# Patient Record
Sex: FEMALE | Race: WHITE | HISPANIC OR LATINO
[De-identification: names, ages, dates, MRNs, and addresses within clinical notes are randomized per-mention and may not be internally consistent; named-entity substitution may affect disease eponyms.]

---

## 2018-05-18 ENCOUNTER — RESULT REVIEW (OUTPATIENT)
Age: 23
End: 2018-05-18

## 2019-02-01 ENCOUNTER — TRANSCRIPTION ENCOUNTER (OUTPATIENT)
Age: 24
End: 2019-02-01

## 2019-09-10 ENCOUNTER — APPOINTMENT (OUTPATIENT)
Dept: DERMATOLOGY | Facility: CLINIC | Age: 24
End: 2019-09-10
Payer: MEDICAID

## 2019-09-10 ENCOUNTER — LABORATORY RESULT (OUTPATIENT)
Age: 24
End: 2019-09-10

## 2019-09-10 DIAGNOSIS — D48.5 NEOPLASM OF UNCERTAIN BEHAVIOR OF SKIN: ICD-10-CM

## 2019-09-10 PROCEDURE — 99204 OFFICE O/P NEW MOD 45 MIN: CPT | Mod: 25

## 2019-09-10 PROCEDURE — 11104 PUNCH BX SKIN SINGLE LESION: CPT

## 2019-09-22 LAB — CORE LAB BIOPSY: NORMAL

## 2019-09-24 ENCOUNTER — APPOINTMENT (OUTPATIENT)
Dept: DERMATOLOGY | Facility: CLINIC | Age: 24
End: 2019-09-24
Payer: MEDICAID

## 2019-09-24 ENCOUNTER — LABORATORY RESULT (OUTPATIENT)
Age: 24
End: 2019-09-24

## 2019-09-24 DIAGNOSIS — L60.3 NAIL DYSTROPHY: ICD-10-CM

## 2019-09-24 DIAGNOSIS — L73.9 FOLLICULAR DISORDER, UNSPECIFIED: ICD-10-CM

## 2019-09-24 PROCEDURE — 99213 OFFICE O/P EST LOW 20 MIN: CPT

## 2019-10-08 LAB — BACTERIA SPEC CULT: ABNORMAL

## 2019-12-05 ENCOUNTER — RESULT REVIEW (OUTPATIENT)
Age: 24
End: 2019-12-05

## 2020-03-03 ENCOUNTER — APPOINTMENT (OUTPATIENT)
Dept: DERMATOLOGY | Facility: CLINIC | Age: 25
End: 2020-03-03

## 2020-03-14 ENCOUNTER — APPOINTMENT (OUTPATIENT)
Dept: DERMATOLOGY | Facility: CLINIC | Age: 25
End: 2020-03-14
Payer: COMMERCIAL

## 2020-03-14 VITALS — WEIGHT: 140 LBS | BODY MASS INDEX: 24.8 KG/M2 | HEIGHT: 63 IN

## 2020-03-14 DIAGNOSIS — L68.0 HIRSUTISM: ICD-10-CM

## 2020-03-14 PROCEDURE — 99214 OFFICE O/P EST MOD 30 MIN: CPT

## 2020-03-14 NOTE — HISTORY OF PRESENT ILLNESS
[de-identified] : Pt. c/o hair on thighs, has had laser in past;  3-4 years ago; did not have good results; \par Has had test spots;  with burning at other office\par

## 2020-03-14 NOTE — PHYSICAL EXAM
[FreeTextEntry3] : Skin examination performed of the face, neck, chest, hands, lower legs;\par The patient is well, alert and oriented, pleasant and cooperative.\par Eyelids, conjunctivae, oral mucosa, digits and nails all normal.  \par No cervical adenopathy.\par \par  thighs;  widespread hair;  thick, dark; also with length; \par

## 2020-03-14 NOTE — ASSESSMENT
[FreeTextEntry1] : Hair;  thighs; \par Risks and benefits of Diode Hair laser were discussed including blistering, crusting, discoloration, lack of response, and need for multiple treatments;\par Long discussion, >25 min\par Discussed that complete removal of large area, with long hairs, may not be possible; \par realistic to expect 4-5 txs, plus maintenance q 6-12 mos; \par \par Tx entire thighs CPP, add bikini (peripheral only) for DYP/tx\par \par

## 2020-04-24 ENCOUNTER — APPOINTMENT (OUTPATIENT)
Dept: DERMATOLOGY | Facility: CLINIC | Age: 25
End: 2020-04-24

## 2021-10-26 ENCOUNTER — APPOINTMENT (OUTPATIENT)
Dept: DERMATOLOGY | Facility: CLINIC | Age: 26
End: 2021-10-26
Payer: MEDICAID

## 2021-10-26 DIAGNOSIS — D22.9 MELANOCYTIC NEVI, UNSPECIFIED: ICD-10-CM

## 2021-10-26 DIAGNOSIS — L21.9 SEBORRHEIC DERMATITIS, UNSPECIFIED: ICD-10-CM

## 2021-10-26 DIAGNOSIS — L70.0 ACNE VULGARIS: ICD-10-CM

## 2021-10-26 DIAGNOSIS — L85.8 OTHER SPECIFIED EPIDERMAL THICKENING: ICD-10-CM

## 2021-10-26 DIAGNOSIS — Z12.83 ENCOUNTER FOR SCREENING FOR MALIGNANT NEOPLASM OF SKIN: ICD-10-CM

## 2021-10-26 DIAGNOSIS — L73.0 ACNE KELOID: ICD-10-CM

## 2021-10-26 PROCEDURE — 99214 OFFICE O/P EST MOD 30 MIN: CPT

## 2021-10-26 RX ORDER — AMMONIUM LACTATE 12 %
12 CREAM (GRAM) TOPICAL
Qty: 1 | Refills: 5 | Status: ACTIVE | COMMUNITY
Start: 2021-10-26 | End: 1900-01-01

## 2021-10-26 RX ORDER — CLINDAMYCIN PHOSPHATE 10 MG/ML
1 LOTION TOPICAL
Qty: 1 | Refills: 5 | Status: ACTIVE | COMMUNITY
Start: 2021-10-26 | End: 1900-01-01

## 2021-10-26 RX ORDER — KETOCONAZOLE 20.5 MG/ML
2 SHAMPOO, SUSPENSION TOPICAL
Qty: 1 | Refills: 11 | Status: ACTIVE | COMMUNITY
Start: 2021-10-26 | End: 1900-01-01

## 2021-10-27 RX ORDER — TRETINOIN 0.25 MG/G
0.03 CREAM TOPICAL
Qty: 1 | Refills: 6 | Status: ACTIVE | COMMUNITY
Start: 2021-10-26

## 2022-02-22 ENCOUNTER — APPOINTMENT (OUTPATIENT)
Dept: DERMATOLOGY | Facility: CLINIC | Age: 27
End: 2022-02-22

## 2024-10-04 ENCOUNTER — NON-APPOINTMENT (OUTPATIENT)
Age: 29
End: 2024-10-04

## 2025-05-29 ENCOUNTER — NON-APPOINTMENT (OUTPATIENT)
Age: 30
End: 2025-05-29

## 2025-08-23 ENCOUNTER — NON-APPOINTMENT (OUTPATIENT)
Age: 30
End: 2025-08-23

## 2025-09-21 ENCOUNTER — NON-APPOINTMENT (OUTPATIENT)
Age: 30
End: 2025-09-21